# Patient Record
Sex: MALE | Race: OTHER | URBAN - METROPOLITAN AREA
[De-identification: names, ages, dates, MRNs, and addresses within clinical notes are randomized per-mention and may not be internally consistent; named-entity substitution may affect disease eponyms.]

---

## 2017-06-14 ENCOUNTER — EMERGENCY (EMERGENCY)
Facility: HOSPITAL | Age: 32
LOS: 1 days | Discharge: HOME | End: 2017-06-14
Attending: EMERGENCY MEDICINE | Admitting: EMERGENCY MEDICINE
Payer: COMMERCIAL

## 2017-06-14 VITALS
RESPIRATION RATE: 18 BRPM | DIASTOLIC BLOOD PRESSURE: 72 MMHG | TEMPERATURE: 98 F | SYSTOLIC BLOOD PRESSURE: 111 MMHG | OXYGEN SATURATION: 97 % | HEART RATE: 56 BPM | WEIGHT: 195.11 LBS

## 2017-06-14 DIAGNOSIS — M25.532 PAIN IN LEFT WRIST: ICD-10-CM
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
## 2017-06-14 DIAGNOSIS — S63.502A UNSPECIFIED SPRAIN OF LEFT WRIST, INITIAL ENCOUNTER: ICD-10-CM
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
## 2017-06-14 PROCEDURE — 99283 EMERGENCY DEPT VISIT LOW MDM: CPT

## 2017-06-14 PROCEDURE — 99283 EMERGENCY DEPT VISIT LOW MDM: CPT | Mod: 25

## 2017-06-14 PROCEDURE — 73110 X-RAY EXAM OF WRIST: CPT

## 2017-06-14 PROCEDURE — 73110 X-RAY EXAM OF WRIST: CPT | Mod: 26,LT

## 2017-06-14 RX ORDER — IBUPROFEN 200 MG
600 TABLET ORAL ONCE
Qty: 0 | Refills: 0 | Status: COMPLETED | OUTPATIENT
Start: 2017-06-14 | End: 2017-06-14

## 2017-06-14 RX ADMIN — Medication 600 MILLIGRAM(S): at 15:32

## 2017-06-14 NOTE — ED ADULT NURSE NOTE - OBJECTIVE STATEMENT
31y M, A&Ox3, came into Er c/o left wrist pain after sustaining fall while playing softball. C/o pain to site. 6/10 states difficulty with "turning doorknob." +ROM, mild swelling to wrist.
Declines

## 2017-06-14 NOTE — ED PROVIDER NOTE - MEDICAL DECISION MAKING DETAILS
32 yo male with left wrist pain, s/p sport related injury. exam- NROM, no discolorations, edema, deformity, neurovascular intact, will check Xray, suspect sprain. will d/c home with wrist splint and ortho hand f/u

## 2017-06-14 NOTE — ED ADULT TRIAGE NOTE - CHIEF COMPLAINT QUOTE
left wrist pain that started 3 days ago after playing softball.  Patient fell down and landed on left wrist.  Abrasion to left lower leg.  Bleeding controlled.  No deformity noted.  Denies head trauma / loc.

## 2017-06-14 NOTE — ED PROVIDER NOTE - OBJECTIVE STATEMENT
30 yo male in the Er with left wrist pain x 3 days. Pt had sport related injury. Denies any weakness, numbness, tingling to his left wrist, no deformity, swelling or discolorations noted. Pr reports that his pain is persistent and worse with any strenuous activity.  Denies any pain to his left elbow.

## 2017-09-14 ENCOUNTER — EMERGENCY (EMERGENCY)
Facility: HOSPITAL | Age: 32
LOS: 1 days | Discharge: HOME | End: 2017-09-14
Admitting: EMERGENCY MEDICINE
Payer: OTHER MISCELLANEOUS

## 2017-09-14 VITALS
SYSTOLIC BLOOD PRESSURE: 152 MMHG | TEMPERATURE: 98 F | OXYGEN SATURATION: 100 % | HEART RATE: 64 BPM | DIASTOLIC BLOOD PRESSURE: 72 MMHG | RESPIRATION RATE: 17 BRPM

## 2017-09-14 VITALS
RESPIRATION RATE: 18 BRPM | DIASTOLIC BLOOD PRESSURE: 80 MMHG | HEIGHT: 70 IN | OXYGEN SATURATION: 100 % | WEIGHT: 195.11 LBS | SYSTOLIC BLOOD PRESSURE: 150 MMHG | TEMPERATURE: 98 F | HEART RATE: 57 BPM

## 2017-09-14 DIAGNOSIS — M25.561 PAIN IN RIGHT KNEE: ICD-10-CM

## 2017-09-14 PROCEDURE — 99283 EMERGENCY DEPT VISIT LOW MDM: CPT

## 2017-09-14 RX ORDER — IBUPROFEN 200 MG
600 TABLET ORAL ONCE
Qty: 0 | Refills: 0 | Status: COMPLETED | OUTPATIENT
Start: 2017-09-14 | End: 2017-09-14

## 2017-09-14 RX ADMIN — Medication 600 MILLIGRAM(S): at 12:51

## 2017-09-14 NOTE — ED PROVIDER NOTE - MEDICAL DECISION MAKING DETAILS
Patient with right knee pain atraumatic, no falls , exacerbated after running. PE normal exam minimal tenderness with rom. Ambulating well. Recommend F/u with ortho . MADELEINE PRIETO .

## 2017-09-14 NOTE — ED PROVIDER NOTE - LOWER EXTREMITY EXAM, RIGHT
Right knee no swelling, erythema, deformity, FROM , no changes  in temp or color. + tenderness anteriorly and laterally. No hip or ankle pain, no motor or sensory deficit , NVI/SWELLING

## 2017-09-14 NOTE — ED ADULT NURSE NOTE - OBJECTIVE STATEMENT
Pt presents to ER c/o right knee pain after rolling his ankle while jogging, "I felt my right knee tighten up and continued to have pain after." No redness, swelling, bruising, deformity noted. Hx right knee ACL repair.

## 2017-09-14 NOTE — ED PROVIDER NOTE - CHPI ED SYMPTOMS NEG
no stiffness/no numbness/no difficulty bearing weight/no deformity/no back pain/no tingling/no weakness

## 2017-09-14 NOTE — ED PROVIDER NOTE - OBJECTIVE STATEMENT
32 y/o f with c/o right knee pain s/p jogging. Patient admit running today and upon stopping he felt a sudden pain to anterior knee. He report of pain only. NO swelling, erythema, fever, paresis, paresthesia.  No prior knee conditions or surgery.

## 2018-10-21 ENCOUNTER — EMERGENCY (EMERGENCY)
Facility: HOSPITAL | Age: 33
LOS: 1 days | Discharge: HOME | End: 2018-10-21
Admitting: EMERGENCY MEDICINE
Payer: COMMERCIAL

## 2018-10-21 VITALS
TEMPERATURE: 98 F | HEART RATE: 61 BPM | WEIGHT: 184.97 LBS | DIASTOLIC BLOOD PRESSURE: 84 MMHG | SYSTOLIC BLOOD PRESSURE: 137 MMHG | OXYGEN SATURATION: 100 % | RESPIRATION RATE: 17 BRPM

## 2018-10-21 LAB
BASOPHILS NFR BLD AUTO: 0.6 % — SIGNIFICANT CHANGE UP (ref 0–2)
EOSINOPHIL NFR BLD AUTO: 1.1 % — SIGNIFICANT CHANGE UP (ref 0–6)
HCT VFR BLD CALC: 37.8 % — LOW (ref 39–50)
HGB BLD-MCNC: 12.9 G/DL — LOW (ref 13–17)
LYMPHOCYTES # BLD AUTO: 23.6 % — SIGNIFICANT CHANGE UP (ref 13–44)
MCHC RBC-ENTMCNC: 31.2 PG — SIGNIFICANT CHANGE UP (ref 27–34)
MCHC RBC-ENTMCNC: 34.1 G/DL — SIGNIFICANT CHANGE UP (ref 32–36)
MCV RBC AUTO: 91.5 FL — SIGNIFICANT CHANGE UP (ref 80–100)
MONOCYTES NFR BLD AUTO: 9.3 % — SIGNIFICANT CHANGE UP (ref 2–14)
NEUTROPHILS NFR BLD AUTO: 65.4 % — SIGNIFICANT CHANGE UP (ref 43–77)
PLATELET # BLD AUTO: 185 K/UL — SIGNIFICANT CHANGE UP (ref 150–400)
RBC # BLD: 4.13 M/UL — LOW (ref 4.2–5.8)
RBC # FLD: 12.4 % — SIGNIFICANT CHANGE UP (ref 10.3–16.9)
WBC # BLD: 5.4 K/UL — SIGNIFICANT CHANGE UP (ref 3.8–10.5)
WBC # FLD AUTO: 5.4 K/UL — SIGNIFICANT CHANGE UP (ref 3.8–10.5)

## 2018-10-21 PROCEDURE — 99284 EMERGENCY DEPT VISIT MOD MDM: CPT | Mod: 25

## 2018-10-21 PROCEDURE — 71046 X-RAY EXAM CHEST 2 VIEWS: CPT

## 2018-10-21 PROCEDURE — 71100 X-RAY EXAM RIBS UNI 2 VIEWS: CPT

## 2018-10-21 PROCEDURE — 71100 X-RAY EXAM RIBS UNI 2 VIEWS: CPT | Mod: 26,LT

## 2018-10-21 PROCEDURE — 85025 COMPLETE CBC W/AUTO DIFF WBC: CPT

## 2018-10-21 PROCEDURE — 71100 X-RAY EXAM RIBS UNI 2 VIEWS: CPT | Mod: 26

## 2018-10-21 PROCEDURE — 71046 X-RAY EXAM CHEST 2 VIEWS: CPT | Mod: 26
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
## 2018-10-21 NOTE — ED PROVIDER NOTE - PHYSICAL EXAMINATION
CONSTITUTIONAL: WD,WN. NAD.    SKIN: Normal color and turgor. No rash.    HEAD: NC/AT.  EYES: Conjunctiva clear. EOMI. PERRL.    ENT: Airway patent, OP without erythema, tonsillar swelling or exudate; uvula midline without swelling. Nasal mucosa clear, no rhinorrhea.   RESPIRATORY:  Breathing non-labored. No retractions or accessory muscle use.  Lungs CTA bilat.  CARDIOVASCULAR:  RRR, S1S2. No M/R/G.      GI:  Abdomen soft, tender left lower ribs, abdomen otherwise nontender.  No splenomegaly to palpation or percussion.    MSK: TTP left lower ribs in midaxillary line.  No bruising or crepitus.  Neck supple with painless ROM.  No lower extremity edema or calf tenderness.  No joint swelling or ROM limitation.  NEURO: Alert and oriented; CN II-XII grossly intact. Speech clear. 5/5 strength in all extremities.  Normal balance and gait.

## 2018-10-21 NOTE — ED PROVIDER NOTE - NS ED ROS FT
NEURO: no LOC, headache, dizziness, weakness, focal weakness/tingling/numbness   EYE: no change in vision or eye injury   ENT: no epistaxis, rhinorrhea, otorrhea, dental injury   PULM: HPI  CV: no chest pain or palpitations; no syncope   GI: no abdominal pain or vomiting   : no pelvic pain, no genital injury   MSK: no neck or back pain, no joint pain   SKIN: no abrasions or lacerations
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
## 2018-10-21 NOTE — ED ADULT NURSE NOTE - CHPI ED NUR SYMPTOMS NEG
no nausea/no weakness/no dizziness/no chills/no decreased eating/drinking/no tingling/no fever/no vomiting
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
## 2018-10-21 NOTE — ED ADULT NURSE NOTE - NSIMPLEMENTINTERV_GEN_ALL_ED
Implemented All Universal Safety Interventions:  [place] to call system. Call bell, personal items and telephone within reach. Instruct patient to call for assistance. Room bathroom lighting operational. Non-slip footwear when patient is off stretcher. Physically safe environment: no spills, clutter or unnecessary equipment. Stretcher in lowest position, wheels locked, appropriate side rails in place.
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
## 2018-10-25 DIAGNOSIS — Z79.1 LONG TERM (CURRENT) USE OF NON-STEROIDAL ANTI-INFLAMMATORIES (NSAID): ICD-10-CM
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
## 2018-10-25 DIAGNOSIS — R07.81 PLEURODYNIA: ICD-10-CM

## 2018-10-25 DIAGNOSIS — S20.212A CONTUSION OF LEFT FRONT WALL OF THORAX, INITIAL ENCOUNTER: ICD-10-CM

## 2019-01-31 NOTE — ED PROVIDER NOTE - PSYCHIATRIC NEGATIVE STATEMENT, MLM
Lakhwinder underwent L4-5 microdiscectomy on 11/1/18, last seen 12/14/18.  Pt was phoned about an appt scheduled for tomorrow.  Pt states he is feeling fine, he does not feel the need to come in for an appt.  Pt verbalized understanding that he can call anytime to reschedule with Dr Miranda if needed.  Demetria Saldana RN   no known mental health issues.

## 2023-05-18 ENCOUNTER — NON-APPOINTMENT (OUTPATIENT)
Age: 38
End: 2023-05-18

## 2023-05-18 PROBLEM — Z00.00 ENCOUNTER FOR PREVENTIVE HEALTH EXAMINATION: Status: ACTIVE | Noted: 2023-05-18

## 2023-05-24 ENCOUNTER — APPOINTMENT (OUTPATIENT)
Dept: INTERNAL MEDICINE | Facility: CLINIC | Age: 38
End: 2023-05-24
